# Patient Record
Sex: MALE | Race: OTHER | HISPANIC OR LATINO | URBAN - METROPOLITAN AREA
[De-identification: names, ages, dates, MRNs, and addresses within clinical notes are randomized per-mention and may not be internally consistent; named-entity substitution may affect disease eponyms.]

---

## 2019-03-26 ENCOUNTER — EMERGENCY (EMERGENCY)
Facility: HOSPITAL | Age: 29
LOS: 1 days | Discharge: HOME | End: 2019-03-26
Attending: EMERGENCY MEDICINE
Payer: COMMERCIAL

## 2019-03-26 VITALS
HEART RATE: 62 BPM | TEMPERATURE: 98 F | DIASTOLIC BLOOD PRESSURE: 76 MMHG | WEIGHT: 194.01 LBS | SYSTOLIC BLOOD PRESSURE: 125 MMHG | RESPIRATION RATE: 18 BRPM | HEIGHT: 69 IN | OXYGEN SATURATION: 99 %

## 2019-03-26 PROCEDURE — 73130 X-RAY EXAM OF HAND: CPT | Mod: 26,RT

## 2019-03-26 PROCEDURE — 29125 APPL SHORT ARM SPLINT STATIC: CPT

## 2019-03-26 PROCEDURE — 93010 ELECTROCARDIOGRAM REPORT: CPT | Mod: 59

## 2019-03-26 PROCEDURE — 93005 ELECTROCARDIOGRAM TRACING: CPT | Mod: XU

## 2019-03-26 PROCEDURE — 99283 EMERGENCY DEPT VISIT LOW MDM: CPT | Mod: 25

## 2019-03-26 PROCEDURE — 73130 X-RAY EXAM OF HAND: CPT

## 2019-03-26 PROCEDURE — 29125 APPL SHORT ARM SPLINT STATIC: CPT | Mod: RT

## 2019-03-26 RX ORDER — ACETAMINOPHEN 500 MG
975 TABLET ORAL ONCE
Qty: 0 | Refills: 0 | Status: COMPLETED | OUTPATIENT
Start: 2019-03-26 | End: 2019-03-26

## 2019-03-26 RX ORDER — BACITRACIN ZINC 500 UNIT/G
1 OINTMENT IN PACKET (EA) TOPICAL ONCE
Qty: 0 | Refills: 0 | Status: COMPLETED | OUTPATIENT
Start: 2019-03-26 | End: 2019-03-26

## 2019-03-26 RX ORDER — IBUPROFEN 200 MG
400 TABLET ORAL ONCE
Qty: 0 | Refills: 0 | Status: COMPLETED | OUTPATIENT
Start: 2019-03-26 | End: 2019-03-26

## 2019-03-26 RX ADMIN — Medication 1 APPLICATION(S): at 21:13

## 2019-03-26 RX ADMIN — Medication 400 MILLIGRAM(S): at 21:13

## 2019-03-26 RX ADMIN — Medication 975 MILLIGRAM(S): at 21:13

## 2019-03-26 NOTE — ED PROVIDER NOTE - MUSCULOSKELETAL [+], MLM
**ATTENDING ADDENDUM (Dr. John Neri): POSITIVE soft-tissue swelling along the dorsum of the right hand./JOINT PAIN

## 2019-03-26 NOTE — ED PROVIDER NOTE - CLINICAL SUMMARY MEDICAL DECISION MAKING FREE TEXT BOX
**ATTENDING MEDICAL DECISION MAKING/SYNTHESIS (Dr. John Neri): I have reviewed the Chief Complaint, the HPI, the ROS, and have directly performed and confirmed the findings on the Physical Examination. I have reviewed the medical decision making with all providers, as applicable. The PROBLEM REPRESENTATION at this time is: 28-year-old man right-hand dominant with right dorsal hand soft-tissue swelling and pain s/p blunt trauma. Also complains of vague centrally-located chest pain, not positional, pleuritic, or exertional (plays lots of soccer without syncope), but reproducible with arm movement? The MOST LIKELY DIAGNOSIS, and the LIST OF DIFFERENTIAL DIAGNOSES, includes (but is not limited to) the following: RIGHT HAND INJURY: fracture, dislocation, contusion, sprain, strain, abrasions. CHEST PAIN: acute coronary syndrome (NO evidence), pulmonary embolism/deep venous thrombosis (NO evidence), pneumothorax (NO evidence), pneumonia (NO evidence), vascular cause e.g. AAA, dissection or equivalent (NO evidence). The likelihood of each of these diagnoses has been appropriately considered in the context of this patient's presentation and my evaluation. PLAN: as described in EMR, including diagnostics, therapeutics and consultation as clinically warranted. I will continue to reevaluate the patient, including the results of all testing, and monitor response to therapy throughout the patient's course in the ED.

## 2019-03-26 NOTE — ED PROVIDER NOTE - OBJECTIVE STATEMENT
29 yo female with no pmh presenting for injury to right hand, states he dropped a metal object on the 29 yo female with no pmh presenting for injury to right hand, states he dropped a metal object on the hand at work. Had increase in swelling and decreased ROM, no numbness, tingling or parasthesias. 29 yo female with no pmh presenting for injury to right hand, states he dropped a metal object on the hand at work. Had increase in swelling and decreased ROM, no numbness, tingling or paresthesias.  **ATTENDING ADDENDUM (Dr. John Neri): I attest that I have directly examined this patient and elicited a comparable history of present illness and review of systems with my collaborating provider (NP/PA). I attest that I have made significant contributions to the documentation where necessary and as noted in the EMR. Of note, and in addition to the above, patient reports that he is right-hand dominant. Injury to dorsal aspect of the right hand as noted above. In addition, patient reported transient and vague chest pain, non-pleuritic, non-exertional, movement-associated, positional, or reproducible with palpation. VAS 1/10.

## 2019-03-26 NOTE — ED PROVIDER NOTE - EKG ADDITIONAL INFORMATION FREE TEXT
**ATTENDING ADDENDUM (Dr. John Neri): NO evidence of arrhythmia, ischemia, infarction or long QT syndrome.

## 2019-03-26 NOTE — ED PROVIDER NOTE - PROGRESS NOTE DETAILS
**ATTENDING ADDENDUM (Dr. John Neri): patient serially evaluated throughout ED course. NO acute deterioration up to this time in the ED. NO evidence of acute coronary syndrome, myocardial infarction, unstable angina, pulmonary embolism/deep venous thrombosis, pneumothorax, vascular etiology e.g. dissection or equivalent, or other worrisome pathology warranting advanced imaging or inpatient observation. ED diagnostics up to this time acknowledged, reviewed and noted. Agree with volar splinting. Agree with discharge home with close outpatient followup with primary care physician/provider.

## 2019-03-26 NOTE — ED PROVIDER NOTE - RESPIRATORY, MLM
Breath sounds clear and equal bilaterally. **ATTENDING ADDENDUM (Dr. John Neri): NO wheezing, rales, rhonchi, crackles, stridor, drooling, retractions, nasal flaring, or tripoding.

## 2019-03-26 NOTE — ED PROVIDER NOTE - CHPI ED SYMPTOMS POS
STIFFNESS/INFLAMMATION/PAIN/TENDERNESS/**ATTENDING ADDENDUM (Dr. John Neri): POSITIVE soft-tissue swelling on the dorsum of the right hand.

## 2019-03-26 NOTE — ED PROVIDER NOTE - OTHER FINDINGS
axis 78 NO ELIZABETH NO evidence of Brugada or long QT syndrome rSR' noted in V1-V2 but narrow QRS duration

## 2019-03-26 NOTE — ED PROVIDER NOTE - CARE PLAN
Principal Discharge DX:	Injury of right hand, initial encounter  Assessment and plan of treatment:	- stay hydrated. ice 20mins on, 40mins off in cycle.  - alternate between tylenol 975mg and ibuprofen 600mg every 4 hours as needed for pain-take with meals.  - follow up with your pcp in 1-2 days.  - follow up with ortho in 2-3 days.  - return if symptoms worsen, fever, weakness, numbness/tingling, blurred vision, difficulty ambulating and all other concerns. Principal Discharge DX:	Injury of right hand, initial encounter  Goal:	**ATTENDING ADDENDUM (Dr. John Neri): Goals of care include resolution of emergent/urgent symptoms and concerns, and restoration to baseline level of homeostasis.  Assessment and plan of treatment:	- stay hydrated. ice 20mins on, 40mins off in cycle.  - alternate between tylenol 975mg and ibuprofen 600mg every 4 hours as needed for pain-take with meals.  - follow up with your pcp in 1-2 days.  - follow up with ortho in 2-3 days.  - return if symptoms worsen, fever, weakness, numbness/tingling, blurred vision, difficulty ambulating and all other concerns.

## 2019-03-26 NOTE — ED PROVIDER NOTE - NSFOLLOWUPINSTRUCTIONS_ED_ALL_ED_FT
- stay hydrated. ice 20mins on, 40mins off in cycle. Keep right wrist splint intact until seen by orthopedics.  - alternate between tylenol 975mg and ibuprofen 600mg every 4 hours as needed for pain-take with meals.  - follow up with your pcp in 1-2 days.  - follow up with ortho in 2-3 days.  - return if symptoms worsen, fever, weakness, numbness/tingling, blurred vision, difficulty ambulating and all other concerns.

## 2019-03-26 NOTE — ED PROVIDER NOTE - DIAGNOSTIC INTERPRETATION
**ATTENDING ADDENDUM (Dr. John Neri): Radiographs reviewed. Pertinent findings include: NO fracture or dislocation.

## 2019-03-26 NOTE — ED PROVIDER NOTE - NSFOLLOWUPCLINICS_GEN_ALL_ED_FT
[facility] Orthopedic Surgery  Orthopedic Surgery  [address], 3rd & 4th [place] [address]  Phone: [phone]  Fax:   Follow Up Time: 1-3 Days    [facility]  Orthopedic Surgery  [address] [place] [id number]  Phone: [phone]  Fax:   Follow Up Time: 1-3 Days

## 2019-03-26 NOTE — ED PROVIDER NOTE - FAMILY DETAILS FREE TEXT FOR MDM ADDL HISTORY OBTAINED FROM QUESTION
**ATTENDING ADDENDUM (Dr. John Neri): family member(s) present during patient's ED visit; corroborated CC, HPI and review of systems as provided by patient.

## 2019-03-26 NOTE — ED PROVIDER NOTE - PHYSICAL EXAMINATION
A&Ox3, NAD. NCAT. PERRL, EOMI. Neck supple, no LAD. Lungs CTAB. +S1S2, RRR, No m/r/g. Abd soft, NT/ND, +BS, no rebound or guarding. Extremities WWP, no edema. Skin without rash. CN II-XII intact. Left hand with diffuse swelling, +TTP to middle of hand, no wrist pain or injury, Skin with overlaying , no purulence or drainaige. Strength 5/5 UE/LE. Sensation normal throughout. Gait normal. A&Ox3, NAD. NCAT. PERRL, EOMI. Neck supple, no LAD. Lungs CTAB. +S1S2, RRR, No m/r/g. Abd soft, NT/ND, +BS, no rebound or guarding. Extremities WWP, no edema. Skin without rash. CN II-XII intact. Left hand with diffuse swelling, +TTP to middle of hand, no wrist pain or injury, Skin with overlaying , no purulence or drainaige. Strength 5/5 UE/LE. Sensation normal throughout. Gait normal.  **ATTENDING ADDENDUM (Dr. John Neri): I have reviewed and substantially contributed to the elements of the PE as documented above. I have directly performed an examination of this patient in conjunction with the other members (EM resident/PA/NP) of the patient care team. Of note, and in addition to the above, there is soft-tissue swelling to the dorsum of the right hand proximate to the 2-4 metacarpals. NO numbness, tingling, weakness, or paresthesias. NO distal discoloration of the fingers of the right hand. Intact radial pulse. NO rotational deformity of the right fingers. NO volar or dorsal angulation noted.

## 2019-03-26 NOTE — ED PROVIDER NOTE - NS ED ROS FT
**ATTENDING ADDENDUM (Dr. John Neri): NO history of Hypertension, hyperlipidemia, Diabetes Mellitus, sudden cardiac death (in family), long QT syndrome, arrhythmia, exertional syncope, recent travel, fevers, chills, sick contacts, or trauma. DENIES cocaine, tobacco, ethanol, or other recreational or other drugs or abuse.

## 2019-03-26 NOTE — ED PROVIDER NOTE - PLAN OF CARE
- stay hydrated. ice 20mins on, 40mins off in cycle.  - alternate between tylenol 975mg and ibuprofen 600mg every 4 hours as needed for pain-take with meals.  - follow up with your pcp in 1-2 days.  - follow up with ortho in 2-3 days.  - return if symptoms worsen, fever, weakness, numbness/tingling, blurred vision, difficulty ambulating and all other concerns. **ATTENDING ADDENDUM (Dr. John Neri): Goals of care include resolution of emergent/urgent symptoms and concerns, and restoration to baseline level of homeostasis.

## 2019-03-26 NOTE — ED PROVIDER NOTE - MUSCULOSKELETAL MINIMAL EXAM
normal range of motion/neck supple/TENDERNESS/motor intact/**ATTENDING ADDENDUM (Dr. John Neri): see comment box above

## 2019-03-26 NOTE — ED ADULT NURSE NOTE - OBJECTIVE STATEMENT
28y male presents to ED complaining of R hand injury. Pt is a/ox4 states that he was trying to lift up a carpet when the top of his R hand struck a metal object. Pt has full ROM in R wrist and digits, with swelling abrasion and tenderness to top of hand

## 2019-03-26 NOTE — ED ADULT TRIAGE NOTE - CHIEF COMPLAINT QUOTE
R hand inj, hit with "piece of metal", abrasion to hand. +swelling, cap refill < 3 secs R hand inj, hit with "piece of metal", abrasion to hand. +swelling, cap refill < 3 secs. also reports that he was carrying heavy carpet when this happened which hit him in the chest. no sob R hand inj, hit with "piece of metal", abrasion to hand. +swelling, cap refill < 3 secs. also reports that he was carrying heavy carpet when this happened which hit him in the chest. no sob/abrasions to chest as per pt

## 2019-03-26 NOTE — ED ADULT NURSE NOTE - CHIEF COMPLAINT QUOTE
R hand inj, hit with "piece of metal", abrasion to hand. +swelling, cap refill < 3 secs. also reports that he was carrying heavy carpet when this happened which hit him in the chest. no sob/abrasions to chest as per pt

## 2019-03-27 NOTE — ED PROCEDURE NOTE - ATTENDING CONTRIBUTION TO CARE
**ATTENDING ADDENDUM (Dr. John Neri): I attest that I have directly examined this patient and reviewed and formulated the diagnostic and therapeutic management plan in collaboration with the advanced practitioner (NP, PA). Please see MDM note and remainder of EMR for findings from CC, HPI, ROS, and PE. (Leana)

## 2019-03-27 NOTE — ED PROCEDURE NOTE - PROCEDURE ADDITIONAL DETAILS
**ATTENDING ADDENDUM (Dr. John Neri): I was present during the key portions of the procedure and immediately available during the entire procedure. Agree with plan and management. Anticipatory guidance provided.

## 2019-03-27 NOTE — ED PROCEDURE NOTE - CPROC ED POST PROC CARE GUIDE1
Instructed patient/caregiver to follow-up with primary care physician./Verbal/written post procedure instructions were given to patient/caregiver./Elevate the injured extremity as instructed./Keep the cast/splint/dressing clean and dry./**ATTENDING ADDENDUM (Dr. John Neri): Anticipatory guidance provided.

## 2020-10-28 NOTE — ED PROVIDER NOTE - ATTENDING CONTRIBUTION TO CARE
lov 9-20  
**ATTENDING ADDENDUM (Dr. John Neri): I attest that I have directly examined this patient and reviewed and formulated the diagnostic and therapeutic management plan in collaboration with the advanced practitioner (NP, PA). Please see MDM note and remainder of EMR for findings from CC, HPI, ROS, and PE. (Leana)

## 2021-06-14 ENCOUNTER — EMERGENCY (EMERGENCY)
Facility: HOSPITAL | Age: 31
LOS: 1 days | Discharge: HOME | End: 2021-06-14
Attending: EMERGENCY MEDICINE
Payer: MEDICAID

## 2021-06-14 VITALS
SYSTOLIC BLOOD PRESSURE: 110 MMHG | HEART RATE: 71 BPM | OXYGEN SATURATION: 97 % | TEMPERATURE: 98 F | RESPIRATION RATE: 14 BRPM | DIASTOLIC BLOOD PRESSURE: 78 MMHG

## 2021-06-14 VITALS
OXYGEN SATURATION: 95 % | TEMPERATURE: 98 F | HEIGHT: 69 IN | RESPIRATION RATE: 18 BRPM | DIASTOLIC BLOOD PRESSURE: 71 MMHG | SYSTOLIC BLOOD PRESSURE: 109 MMHG | HEART RATE: 73 BPM | WEIGHT: 192.02 LBS

## 2021-06-14 LAB
APPEARANCE UR: CLEAR — SIGNIFICANT CHANGE UP
BILIRUB UR-MCNC: NEGATIVE — SIGNIFICANT CHANGE UP
COLOR SPEC: SIGNIFICANT CHANGE UP
DIFF PNL FLD: NEGATIVE — SIGNIFICANT CHANGE UP
GLUCOSE UR QL: NEGATIVE — SIGNIFICANT CHANGE UP
KETONES UR-MCNC: NEGATIVE — SIGNIFICANT CHANGE UP
LEUKOCYTE ESTERASE UR-ACNC: NEGATIVE — SIGNIFICANT CHANGE UP
NITRITE UR-MCNC: NEGATIVE — SIGNIFICANT CHANGE UP
PH UR: 6.5 — SIGNIFICANT CHANGE UP (ref 5–8)
PROT UR-MCNC: NEGATIVE — SIGNIFICANT CHANGE UP
SP GR SPEC: 1.02 — SIGNIFICANT CHANGE UP (ref 1.01–1.02)
UROBILINOGEN FLD QL: NEGATIVE — SIGNIFICANT CHANGE UP

## 2021-06-14 PROCEDURE — 99284 EMERGENCY DEPT VISIT MOD MDM: CPT

## 2021-06-14 PROCEDURE — 99283 EMERGENCY DEPT VISIT LOW MDM: CPT | Mod: 25

## 2021-06-14 PROCEDURE — 81003 URINALYSIS AUTO W/O SCOPE: CPT

## 2021-06-14 PROCEDURE — 87491 CHLMYD TRACH DNA AMP PROBE: CPT

## 2021-06-14 PROCEDURE — 87086 URINE CULTURE/COLONY COUNT: CPT

## 2021-06-14 PROCEDURE — 96372 THER/PROPH/DIAG INJ SC/IM: CPT | Mod: XU

## 2021-06-14 PROCEDURE — 87591 N.GONORRHOEAE DNA AMP PROB: CPT

## 2021-06-14 RX ORDER — KETOROLAC TROMETHAMINE 30 MG/ML
15 SYRINGE (ML) INJECTION ONCE
Refills: 0 | Status: DISCONTINUED | OUTPATIENT
Start: 2021-06-14 | End: 2021-06-14

## 2021-06-14 RX ORDER — LIDOCAINE 4 G/100G
1 CREAM TOPICAL ONCE
Refills: 0 | Status: COMPLETED | OUTPATIENT
Start: 2021-06-14 | End: 2021-06-14

## 2021-06-14 RX ORDER — ACETAMINOPHEN 500 MG
975 TABLET ORAL ONCE
Refills: 0 | Status: COMPLETED | OUTPATIENT
Start: 2021-06-14 | End: 2021-06-14

## 2021-06-14 RX ADMIN — LIDOCAINE 1 PATCH: 4 CREAM TOPICAL at 09:41

## 2021-06-14 RX ADMIN — Medication 975 MILLIGRAM(S): at 09:41

## 2021-06-14 RX ADMIN — Medication 15 MILLIGRAM(S): at 09:41

## 2021-06-14 NOTE — ED ADULT TRIAGE NOTE - CHIEF COMPLAINT QUOTE
lower back pain since Friday, pt works as , pain radiates down R leg per pt, took muscle relaxant yesterday without relief, + hematuria, denies incontinence

## 2021-06-14 NOTE — ED PROVIDER NOTE - OBJECTIVE STATEMENT
30 year old male with history of chronic intermittent low back pain following injury in 2012, presenting with acute on chronic low back pain x 3d. States that he has had sharp low back/buttock region pain radiating down to b/l knees not improved with flexeril, some relief with diclofenac shot yesterday. Denies fevers/chills, urinary/fecal incontinence, saddle anesthesia, weakness. Does report 1 episode of "reddish urine" yesterday, "sometimes painful".

## 2021-06-14 NOTE — ED PROVIDER NOTE - ATTENDING CONTRIBUTION TO CARE
Attending MD Kirk:  I personally have seen and examined this patient.  Resident note reviewed and agree on plan of care and except where noted.  See HPI, PE, and MDM for details.

## 2021-06-14 NOTE — ED PROVIDER NOTE - NS ED ROS FT
Gen: No fever, no weight loss, no fatigue  CV: No chest pain, no palpitations  HEENT: No sore throat, no hoarseness  Skin: No rash, no color changes  Resp: No SOB, no cough  Endo: No sensitivity to heat or cold, no appetite changes  GI: No constipation, no diarrhea, no nausea, no vomiting  Msk: +back pain, no LE swelling, +extremity pain  : No dysuria, no increased frequency  Neuro: No LOC, no weakness, no numbness

## 2021-06-14 NOTE — ED ADULT NURSE NOTE - CHIEF COMPLAINT QUOTE
BURN
lower back pain since Friday, pt works as , pain radiates down R leg per pt, took muscle relaxant yesterday without relief, + hematuria, denies incontinence

## 2021-06-14 NOTE — ED PROVIDER NOTE - PATIENT PORTAL LINK FT
You can access the FollowMyHealth Patient Portal offered by [facility] by registering at the following website: [email or web address]. By joining [employer]’s FollowMyHealth portal, you will also be able to view your health information using other applications (apps) compatible with our system.

## 2021-06-14 NOTE — ED ADULT NURSE NOTE - OBJECTIVE STATEMENT
30 year old male with history of chronic intermittent low back pain following injury in 2012, presenting with acute on chronic low back pain x 3d. States that he has had sharp low back/buttock region pain radiating down to b/l knees not improved with flexeril, some relief with diclofenac shot yesterday. Denies fevers/chills, urinary/fecal incontinence, saddle anesthesia, weakness. Does report 1 episode of "reddish urine" yesterday, "sometimes painful"  Pt able to ambulate with some difficulty no numbness or tingling noted Pt states he has some painful urination at times but does not relate it to his back pain [place]

## 2021-06-14 NOTE — ED PROVIDER NOTE - NSFOLLOWUPINSTRUCTIONS_ED_ALL_ED_FT
You were seen in the Emergency Department for: back pain    For pain/fever, you may take Tylenol (acetaminophen) 650 mg every 6 hours, OR Advil (ibuprofen) 600 mg every 8 hours.    Please follow up with a Spine Specialist as discussed. You were referred to our Discharge Lounge and someone will call you within 24-48 hours to help set up an appointment. If you are not contacted within that time, please call [phone] to find a provider who is convenient for you.    You should return to the Emergency Department if you feel any new/worsening/persistent symptoms including but not limited to: numbness/weakness of a body part, fever, chills, difficulty urinating or moving your bowels, chest pain, difficulty breathing, loss of consciousness, bleeding, uncontrolled pain

## 2021-06-14 NOTE — ED PROVIDER NOTE - PHYSICAL EXAMINATION
Gen - NAD; mildly uncomfortable appearing; A+Ox3   HEENT - NCAT, EOMI  Neck - supple  Resp - CTAB, symmetric breath sounds  CV -  RRR  Abd - soft, NT, ND; no guarding or rebound  Back - no midline or CVA tenderness  MSK - 5/5 strength and FROM b/l UE and LE; +straight leg test L  Extrem - 3+ distal pulses b/L UE and LE  Skin - no rash or bruising, warm and well perfused  Neuro - no focal motor or sensation deficits, normal gait

## 2021-06-14 NOTE — ED PROVIDER NOTE - CLINICAL SUMMARY MEDICAL DECISION MAKING FREE TEXT BOX
Attending MD Kirk:  30M with left low back pain, acute on chronic. History without red flags to suggest cauda equina or serious spinal pathology, examination wnl, no midline spinal ttp, motor and sensory intact b/l LEs. Likely lumbosacral strain vs lumbar radiculopathy. No indication for urgent spinal imaging. Patient notes maybe "red" urine over past few days and dysuria "sometimes". I do not suspect ureteral colic or complicated  infection in this gentleman given history points clearly to mechanical low back pain but will check screening UA and G/C.  examination was normal. Plan for analgesia, referral for PT and spine specialist for conservative management       *The above represents an initial assessment/impression. Please refer to progress notes for potential changes in patient clinical course*

## 2021-06-15 PROBLEM — M54.9 DORSALGIA, UNSPECIFIED: Chronic | Status: ACTIVE | Noted: 2021-06-14

## 2021-06-15 PROBLEM — Z00.00 ENCOUNTER FOR PREVENTIVE HEALTH EXAMINATION: Status: ACTIVE | Noted: 2021-06-15

## 2021-06-15 LAB
C TRACH RRNA SPEC QL NAA+PROBE: SIGNIFICANT CHANGE UP
CULTURE RESULTS: SIGNIFICANT CHANGE UP
N GONORRHOEA RRNA SPEC QL NAA+PROBE: SIGNIFICANT CHANGE UP
SPECIMEN SOURCE: SIGNIFICANT CHANGE UP
SPECIMEN SOURCE: SIGNIFICANT CHANGE UP

## 2021-06-16 ENCOUNTER — APPOINTMENT (OUTPATIENT)
Dept: ORTHOPEDIC SURGERY | Facility: CLINIC | Age: 31
End: 2021-06-16
Payer: MEDICAID

## 2021-06-16 VITALS
BODY MASS INDEX: 30.86 KG/M2 | HEART RATE: 73 BPM | DIASTOLIC BLOOD PRESSURE: 63 MMHG | WEIGHT: 192 LBS | SYSTOLIC BLOOD PRESSURE: 117 MMHG | HEIGHT: 66 IN

## 2021-06-16 DIAGNOSIS — M54.16 RADICULOPATHY, LUMBAR REGION: ICD-10-CM

## 2021-06-16 DIAGNOSIS — M51.36 OTHER INTERVERTEBRAL DISC DEGENERATION, LUMBAR REGION: ICD-10-CM

## 2021-06-16 DIAGNOSIS — M54.5 LOW BACK PAIN: ICD-10-CM

## 2021-06-16 PROCEDURE — 72100 X-RAY EXAM L-S SPINE 2/3 VWS: CPT | Mod: 26

## 2021-06-16 PROCEDURE — 99204 OFFICE O/P NEW MOD 45 MIN: CPT

## 2021-06-16 RX ORDER — METHYLPREDNISOLONE 4 MG/1
4 TABLET ORAL
Qty: 1 | Refills: 0 | Status: ACTIVE | COMMUNITY
Start: 2021-06-16 | End: 1900-01-01

## 2021-06-28 ENCOUNTER — NON-APPOINTMENT (OUTPATIENT)
Age: 31
End: 2021-06-28

## 2021-11-08 ENCOUNTER — EMERGENCY (EMERGENCY)
Facility: HOSPITAL | Age: 31
LOS: 1 days | Discharge: HOME | End: 2021-11-08
Attending: EMERGENCY MEDICINE
Payer: MEDICAID

## 2021-11-08 VITALS
HEART RATE: 55 BPM | TEMPERATURE: 98 F | OXYGEN SATURATION: 100 % | SYSTOLIC BLOOD PRESSURE: 127 MMHG | WEIGHT: 190.04 LBS | DIASTOLIC BLOOD PRESSURE: 74 MMHG | RESPIRATION RATE: 16 BRPM

## 2021-11-08 PROCEDURE — 71101 X-RAY EXAM UNILAT RIBS/CHEST: CPT | Mod: 26

## 2021-11-08 PROCEDURE — 71250 CT THORAX DX C-: CPT | Mod: 26,MG

## 2021-11-08 PROCEDURE — 99284 EMERGENCY DEPT VISIT MOD MDM: CPT | Mod: 25

## 2021-11-08 PROCEDURE — G1004: CPT

## 2021-11-08 PROCEDURE — 99284 EMERGENCY DEPT VISIT MOD MDM: CPT

## 2021-11-08 PROCEDURE — 71101 X-RAY EXAM UNILAT RIBS/CHEST: CPT

## 2021-11-08 PROCEDURE — 71250 CT THORAX DX C-: CPT | Mod: MG

## 2021-11-08 RX ORDER — ACETAMINOPHEN 500 MG
975 TABLET ORAL ONCE
Refills: 0 | Status: COMPLETED | OUTPATIENT
Start: 2021-11-08 | End: 2021-11-08

## 2021-11-08 RX ORDER — LIDOCAINE 4 G/100G
1 CREAM TOPICAL ONCE
Refills: 0 | Status: COMPLETED | OUTPATIENT
Start: 2021-11-08 | End: 2021-11-08

## 2021-11-08 RX ADMIN — Medication 975 MILLIGRAM(S): at 21:13

## 2021-11-08 RX ADMIN — LIDOCAINE 1 PATCH: 4 CREAM TOPICAL at 21:12

## 2021-11-08 RX ADMIN — Medication 975 MILLIGRAM(S): at 21:12

## 2021-11-08 NOTE — ED PROVIDER NOTE - MUSCULOSKELETAL, MLM
left sided rib pain in midclavicular line below nipple, tender to palpation. Spine appears normal, range of motion is not limited, no muscle or joint tenderness

## 2021-11-08 NOTE — ED PROVIDER NOTE - PATIENT PORTAL LINK FT
You can access the FollowMyHealth Patient Portal offered by [facility] by registering at the following website: [email or web address]. By joining [employer]’s FollowMyHealth portal, you will also be able to view your health information using other applications (apps) compatible with our system.

## 2021-11-08 NOTE — ED PROVIDER NOTE - SKIN, MLM
Skin normal color for race, warm, dry and intact. no ecchymosis, swelling or erythema to left anterolateral chest wall

## 2021-11-08 NOTE — ED ADULT TRIAGE NOTE - CHIEF COMPLAINT QUOTE
PT C/O right sided rib pain for one week.  Reports last week when he was playing soccer he got elbowed in the ribs.  No bruising.  Pain with inspiration. Denies SOB.

## 2021-11-08 NOTE — ED PROVIDER NOTE - CLINICAL SUMMARY MEDICAL DECISION MAKING FREE TEXT BOX
31 yo male pt presents with left sided rib/anterolateral chest wall pain 2/2 to direct blow from elbow during soccer game last week. pt states pain is getting worse and yesterday started to have pain with deep breathing.   ordered imaging and medication

## 2021-11-08 NOTE — ED PROVIDER NOTE - ATTENDING CONTRIBUTION TO CARE
The patient seen and examined    Rib Pain    I, Norman Arambula, performed the initial face to face bedside interview with this patient regarding history of present illness, review of symptoms and relevant past medical, social and family history.  I completed an independent physical examination.  I was the initial provider who evaluated this patient. I have signed out the follow up of any pending tests (i.e. labs, radiological studies) to the ACP and PA student.  I have communicated the patient’s plan of care and disposition with the ACP and PA student.

## 2021-11-08 NOTE — ED PROVIDER NOTE - OBJECTIVE STATEMENT
29 yo male pt with h/o kidney problems x10 years ago (does not know name of condition), presents to ED due to left sided rib pain x1 week following hit with elbow during soccer now worsening with pleuritic chest pain with inspiration radiating to the axilla. notes pain is worse with movement, palpation and breathing. Pt did not take any pain medication, has been icing the area. pt denies fever, headaches, chills, cough, SOB, palpitations, nausea, vomiting, and abdominal pain.

## 2025-02-02 ENCOUNTER — INPATIENT (INPATIENT)
Facility: HOSPITAL | Age: 35
LOS: 0 days | Discharge: HOME | DRG: 195 | End: 2025-02-03
Attending: HOSPITALIST | Admitting: HOSPITALIST
Payer: MEDICAID

## 2025-02-02 VITALS
TEMPERATURE: 101 F | DIASTOLIC BLOOD PRESSURE: 43 MMHG | WEIGHT: 196.21 LBS | OXYGEN SATURATION: 89 % | SYSTOLIC BLOOD PRESSURE: 69 MMHG | RESPIRATION RATE: 26 BRPM | HEART RATE: 211 BPM

## 2025-02-02 LAB
ALBUMIN SERPL ELPH-MCNC: 4.1 G/DL — SIGNIFICANT CHANGE UP (ref 3.3–5.2)
ALP SERPL-CCNC: 58 U/L — SIGNIFICANT CHANGE UP (ref 40–120)
ALT FLD-CCNC: 12 U/L — SIGNIFICANT CHANGE UP
ANION GAP SERPL CALC-SCNC: 12 MMOL/L — SIGNIFICANT CHANGE UP (ref 5–17)
APTT BLD: 29.3 SEC — SIGNIFICANT CHANGE UP (ref 24.5–35.6)
AST SERPL-CCNC: 20 U/L — SIGNIFICANT CHANGE UP
BASOPHILS # BLD AUTO: 0.01 K/UL — SIGNIFICANT CHANGE UP (ref 0–0.2)
BASOPHILS NFR BLD AUTO: 0.2 % — SIGNIFICANT CHANGE UP (ref 0–2)
BILIRUB SERPL-MCNC: 0.3 MG/DL — LOW (ref 0.4–2)
BUN SERPL-MCNC: 13 MG/DL — SIGNIFICANT CHANGE UP (ref 8–20)
CALCIUM SERPL-MCNC: 8.8 MG/DL — SIGNIFICANT CHANGE UP (ref 8.4–10.5)
CHLORIDE SERPL-SCNC: 99 MMOL/L — SIGNIFICANT CHANGE UP (ref 96–108)
CO2 SERPL-SCNC: 24 MMOL/L — SIGNIFICANT CHANGE UP (ref 22–29)
CREAT SERPL-MCNC: 1.26 MG/DL — SIGNIFICANT CHANGE UP (ref 0.5–1.3)
EGFR: 78 ML/MIN/1.73M2 — SIGNIFICANT CHANGE UP
EOSINOPHIL # BLD AUTO: 0 K/UL — SIGNIFICANT CHANGE UP (ref 0–0.5)
EOSINOPHIL NFR BLD AUTO: 0 % — SIGNIFICANT CHANGE UP (ref 0–6)
GAS PNL BLDV: SIGNIFICANT CHANGE UP
GLUCOSE SERPL-MCNC: 139 MG/DL — HIGH (ref 70–99)
HCT VFR BLD CALC: 36 % — LOW (ref 39–50)
HGB BLD-MCNC: 13 G/DL — SIGNIFICANT CHANGE UP (ref 13–17)
HIV 1 & 2 AB SERPL IA.RAPID: SIGNIFICANT CHANGE UP
IMM GRANULOCYTES NFR BLD AUTO: 0.4 % — SIGNIFICANT CHANGE UP (ref 0–0.9)
INR BLD: 1.08 RATIO — SIGNIFICANT CHANGE UP (ref 0.85–1.16)
LYMPHOCYTES # BLD AUTO: 0.66 K/UL — LOW (ref 1–3.3)
LYMPHOCYTES # BLD AUTO: 12.4 % — LOW (ref 13–44)
MCHC RBC-ENTMCNC: 30 PG — SIGNIFICANT CHANGE UP (ref 27–34)
MCHC RBC-ENTMCNC: 36.1 G/DL — HIGH (ref 32–36)
MCV RBC AUTO: 83.1 FL — SIGNIFICANT CHANGE UP (ref 80–100)
MONOCYTES # BLD AUTO: 0.23 K/UL — SIGNIFICANT CHANGE UP (ref 0–0.9)
MONOCYTES NFR BLD AUTO: 4.3 % — SIGNIFICANT CHANGE UP (ref 2–14)
NEUTROPHILS # BLD AUTO: 4.42 K/UL — SIGNIFICANT CHANGE UP (ref 1.8–7.4)
NEUTROPHILS NFR BLD AUTO: 82.7 % — HIGH (ref 43–77)
PLATELET # BLD AUTO: 204 K/UL — SIGNIFICANT CHANGE UP (ref 150–400)
POTASSIUM SERPL-MCNC: 3.4 MMOL/L — LOW (ref 3.5–5.3)
POTASSIUM SERPL-SCNC: 3.4 MMOL/L — LOW (ref 3.5–5.3)
PROT SERPL-MCNC: 7.6 G/DL — SIGNIFICANT CHANGE UP (ref 6.6–8.7)
PROTHROM AB SERPL-ACNC: 12.2 SEC — SIGNIFICANT CHANGE UP (ref 9.9–13.4)
RBC # BLD: 4.33 M/UL — SIGNIFICANT CHANGE UP (ref 4.2–5.8)
RBC # FLD: 11.4 % — SIGNIFICANT CHANGE UP (ref 10.3–14.5)
SODIUM SERPL-SCNC: 135 MMOL/L — SIGNIFICANT CHANGE UP (ref 135–145)
TROPONIN T, HIGH SENSITIVITY RESULT: <6 NG/L — SIGNIFICANT CHANGE UP (ref 0–51)
WBC # BLD: 5.34 K/UL — SIGNIFICANT CHANGE UP (ref 3.8–10.5)
WBC # FLD AUTO: 5.34 K/UL — SIGNIFICANT CHANGE UP (ref 3.8–10.5)

## 2025-02-02 PROCEDURE — 99291 CRITICAL CARE FIRST HOUR: CPT

## 2025-02-02 PROCEDURE — 71045 X-RAY EXAM CHEST 1 VIEW: CPT | Mod: 26

## 2025-02-02 RX ORDER — BACTERIOSTATIC SODIUM CHLORIDE 0.9 %
2800 VIAL (ML) INJECTION ONCE
Refills: 0 | Status: COMPLETED | OUTPATIENT
Start: 2025-02-02 | End: 2025-02-02

## 2025-02-02 RX ORDER — ACETAMINOPHEN 160 MG/5ML
975 SUSPENSION ORAL ONCE
Refills: 0 | Status: COMPLETED | OUTPATIENT
Start: 2025-02-02 | End: 2025-02-02

## 2025-02-02 RX ORDER — CEFTRIAXONE 250 MG/1
1000 INJECTION, POWDER, FOR SOLUTION INTRAMUSCULAR; INTRAVENOUS ONCE
Refills: 0 | Status: COMPLETED | OUTPATIENT
Start: 2025-02-02 | End: 2025-02-02

## 2025-02-02 RX ORDER — CEFTRIAXONE 250 MG/1
1000 INJECTION, POWDER, FOR SOLUTION INTRAMUSCULAR; INTRAVENOUS ONCE
Refills: 0 | Status: DISCONTINUED | OUTPATIENT
Start: 2025-02-02 | End: 2025-02-02

## 2025-02-02 RX ADMIN — CEFTRIAXONE 1000 MILLIGRAM(S): 250 INJECTION, POWDER, FOR SOLUTION INTRAMUSCULAR; INTRAVENOUS at 22:50

## 2025-02-02 RX ADMIN — ACETAMINOPHEN 975 MILLIGRAM(S): 160 SUSPENSION ORAL at 22:50

## 2025-02-02 RX ADMIN — Medication 2800 MILLILITER(S): at 23:28

## 2025-02-02 RX ADMIN — Medication 2800 MILLILITER(S): at 22:40

## 2025-02-02 NOTE — ED ADULT TRIAGE NOTE - PAIN RATING/NUMBER SCALE (0-10): ACTIVITY
The skin at the access site was anesthetized. Using a micropuncture needle with the Seldinger technique and ultrasound (TreSensa) the right femoral vein was succesfully accessed in an antegrade fashion over the guidewire, under fluoroscopic guidance using a Ss Kit 6fr 10cm, Flex Straight 0.021in X 45cm. Ultrasound found the vessel was patent. A permanent recording was saved.  6 (moderate pain)

## 2025-02-02 NOTE — ED ADULT NURSE NOTE - NSFALLUNIVINTERV_ED_ALL_ED
Bed/Stretcher in lowest position, wheels locked, appropriate side rails in place/Call bell, personal items and telephone in reach/Instruct patient to call for assistance before getting out of bed/chair/stretcher/Non-slip footwear applied when patient is off stretcher/[place] to call system/Physically safe environment - no spills, clutter or unnecessary equipment/Purposeful proactive rounding/Room/bathroom lighting operational, light cord in reach

## 2025-02-02 NOTE — ED PROVIDER NOTE - PHYSICAL EXAMINATION
General: NAD, weak appearing  HEENT: Normocephalic, atraumatic  Neck: No apparent stiffness or JVD  Pulm: Chest wall symmetric and nontender, lungs clear to ascultation   Cardiac: Regular rate and regular rhythm  Abdomen: Nontender and nondistended  Skin: Skin is warm, dry and intact without rashes or lesions.  Neuro: No motor or sensory deficits above reported baseline  MSK: No deformity or tenderness above reported baseline

## 2025-02-02 NOTE — ED PROVIDER NOTE - OBJECTIVE STATEMENT
Pt is a 33 yo male with no significant PMH presenting with fever. Pt had a fever, cough and malaise for 3 days now that worsened today. Pt's daughter tested positive for influenze 5 days ago and had similar symptoms. Pt reports that he last took tylenol 6 hours prior to arrival. Tonight, pt felt very weak and lightheaded, and has not been able to eat or drink much in the past 2 days. pt denies SOB, chest pain, vomiting, diarrhea, urinary symptoms.

## 2025-02-02 NOTE — ED PROVIDER NOTE - ATTENDING CONTRIBUTION TO CARE
Called to bedside by triage nurse for evaluation of acute hypotension.   33 yo male with no significant PMH presenting with fever. Pt had a fever, cough and malaise for 3 days now that worsened today. Pt's daughter tested positive for influenza 5 days ago and had similar symptoms. Pt reports that he last took Tylenol 6 hours prior to arrival. Tonight, pt felt very weak and lightheaded, and has not been able to eat or drink much in the past 2 days. pt denies SOB, chest pain, vomiting, diarrhea, urinary symptoms.    CONSTITUTIONAL: In no apparent distress.  HEENMT: Airway patent,  normal appearing mouth, nose, throat, neck supple with full range of motion, no cervical adenopathy.  EYES: Pupils equal, round and reactive to light, Extra-ocular movement intact, eyes are clear b/l  CARDIAC: tachycardic rate and rhythm, Heart sounds S1 S2 present  RESPIRATORY: No respiratory distress. No stridor, Lungs sounds clear with good aeration bilaterally.   GASTROINTESTINAL: Abdomen soft, non-tender and non-distended, no rebound, no guarding and no masses.   MUSCULOSKELETAL: Spine appears normal, movement of extremities grossly intact.  NEUROLOGICAL: Alert and interactive, no focal deficits, tone is normal, moving all extremities well,  SKIN: Hot no cyanosis, no pallor, no jaundice, no rash    Due to the a high probability of clinically significant, life threatening deterioration, the patient required high level of preparedness to intervene emergently. I personally spent critical care time directly and personally managing the patient. This included (but not limited too reviewing  vitals, managing orders, and determining and adjusting plan depending on response to interventions.     I, Anastasia Duvall, personally saw the patient with the resident, and completed the key components of the history and physical exam. I then discussed the management plan with the resident.

## 2025-02-02 NOTE — ED ADULT NURSE NOTE - OBJECTIVE STATEMENT
pt presenting to ED for generalized malaise, fevers, cough x3 days. pt daughter sick at home +flu. Upon initial vitals pt hypotensive, tachycardiac and febrile. Code sepsis protocol initiated. pt a&ox4, able to speak in full sentences, denies shortness of breath, chest pain, N/V/D.

## 2025-02-02 NOTE — ED PROVIDER NOTE - CLINICAL SUMMARY MEDICAL DECISION MAKING FREE TEXT BOX
Pt is a 35 yo male with no significant PMH presenting with fever. Pt had a fever, cough and malaise for 3 days now that worsened today. Pt's daughter tested positive for influenze 5 days ago and had similar symptoms. Pt reports that he last took tylenol 6 hours prior to arrival. Tonight, pt felt very weak and lightheaded, and has not been able to eat or drink much in the past 2 days. pt denies SOB, chest pain, vomiting, diarrhea, urinary symptoms.     vs shows fever. Pt came in with vitals of HR>200 and sbp 69, repeat vitals show normal bp and normal HR. Meeting sirs criteria, ordered sepsis workup, rocephin, tylenol and sepsis bolus. likely viral. exam shows clear lungs, soft nontender abdomen. Pt is a 35 yo male with no significant PMH presenting with fever. Pt had a fever, cough and malaise for 3 days now that worsened today. Pt's daughter tested positive for influenze 5 days ago and had similar symptoms. Pt reports that he last took tylenol 6 hours prior to arrival. Tonight, pt felt very weak and lightheaded, and has not been able to eat or drink much in the past 2 days. pt denies SOB, chest pain, vomiting, diarrhea, urinary symptoms.     vs shows fever. Pt came in with vitals of HR>200 and sbp 69, repeat vitals show normal bp and normal HR. Meeting sirs criteria, ordered sepsis workup, rocephin, tylenol and sepsis bolus. likely viral. exam shows clear lungs, soft nontender abdomen.    Patient noted to be orthostatic. Will hydrate and re-evaluate. Pt is a 35 yo male with no significant PMH presenting with fever. Pt had a fever, cough and malaise for 3 days now that worsened today. Pt's daughter tested positive for influenze 5 days ago and had similar symptoms. Pt reports that he last took tylenol 6 hours prior to arrival. Tonight, pt felt very weak and lightheaded, and has not been able to eat or drink much in the past 2 days. pt denies SOB, chest pain, vomiting, diarrhea, urinary symptoms.     vs shows fever. Pt came in with vitals of HR>200 and sbp 69, repeat vitals show normal bp and normal HR. Meeting sirs criteria, ordered sepsis workup, rocephin, tylenol and sepsis bolus. likely viral. exam shows clear lungs, soft nontender abdomen.    Patient noted to be orthostatic. Will hydrate and re-evaluate. positive for flu A.     Gal: pt still febrile after 3800mL ns and multiple tylenols and toradol. pt becoming intermittently mildly hypotensive satting in low 90s. admitted to tele. added azithro and tamiflu. ordered cooling blanket.

## 2025-02-02 NOTE — ED PROVIDER NOTE - PROGRESS NOTE DETAILS
Gal: Pt feels improved after additional meds and another liter NS. given strict return precautions and instructed to follow up with pcp after course of antibiotics. Gal: Pt feels improved after additional meds and another liter NS. Gal: pt still febrile after 3800mL ns and multiple tylenols and toradol. pt becoming intermittently mildly hypotensive satting in low 90s. admitted to tele. added azithro and tamiflu. ordered cooling blanket.

## 2025-02-02 NOTE — ED PROVIDER NOTE - PATIENT PORTAL LINK FT
You can access the FollowMyHealth Patient Portal offered by [facility] by registering at the following website: [email or web address]. By joining [employer]’s FollowMyHealth portal, you will also be able to view your health information using other applications (apps) compatible with our system.

## 2025-02-02 NOTE — ED PROVIDER NOTE - CARE PLAN
1 Principal Discharge DX:	Right lower lobe pneumonia  Secondary Diagnosis:	Acute dehydration   Principal Discharge DX:	Right lower lobe pneumonia  Secondary Diagnosis:	Acute dehydration  Secondary Diagnosis:	Influenza

## 2025-02-02 NOTE — ED PROVIDER NOTE - NSFOLLOWUPINSTRUCTIONS_ED_ALL_ED_FT
1) Follow up with your doctor in 1-2 days  2) Return to the ER for worsening or concerning symptoms  3) Take medication as prescribed    Community-Acquired Pneumonia, Adult    Pneumonia is a lung infection that causes inflammation and the buildup of mucus and fluids in the lungs. This may cause coughing and difficulty breathing. Community-acquired pneumonia is pneumonia that develops in people who are not, and have not recently been, in a hospital or other health care facility.    Usually, pneumonia develops as a result of an illness that is caused by a virus, such as the common cold and the flu (influenza). It can also be caused by bacteria or fungi. While the common cold and influenza can pass from person to person (are contagious), pneumonia itself is not considered contagious.    What are the causes?     This condition may be caused by:    Viruses.  Bacteria.  Fungi, such as molds or mushrooms.    What increases the risk?  The following factors may make you more likely to develop this condition:    Having certain medical conditions, such as:    A long-term (chronic) disease, which may include chronic obstructive pulmonary disease (COPD), asthma, heart failure, cystic fibrosis, diabetes, kidney disease, sickle cell disease, and human immunodeficiency virus (HIV).  A condition that increases the risk of breathing in (aspirating) mucus and other fluids from your mouth and nose.  A weakened body defense system (immune system).  Having had your spleen removed (splenectomy). The spleen is the organ that helps fight germs and infections.  Not cleaning your teeth and gums well (poor dental hygiene).  Using tobacco products.  Traveling to places where germs that cause pneumonia are present.  Being near certain animals, or animal habitats, that have germs that cause pneumonia.  Being older than 65 years of age.    What are the signs or symptoms?  Symptoms of this condition include:    A dry cough or a wet (productive) cough.  A fever.  Sweating or chills.  Chest pain, especially when breathing deeply or coughing.  Fast breathing, difficulty breathing, or shortness of breath.  Tiredness (fatigue).  Muscle aches.    How is this diagnosed?     This condition may be diagnosed based on your medical history or a physical exam. You may also have tests, including:    Chest X-rays.  Tests of the level of oxygen and other gases in your blood.  Tests of:    Your blood.  Mucus from your lungs (sputum).  Fluid around your lungs (pleural fluid).  Your urine.    If your pneumonia is severe, other tests may be done to learn more about the cause.    How is this treated?  Treatment for this condition depends on many factors, such as the cause of your pneumonia, your medicines, and other medical conditions that you have.    For most adults, pneumonia may be treated at home. In some cases, treatment must happen in a hospital and may include:    Medicines that are given by mouth (orally) or through an IV, including:    Antibiotic medicines, if bacteria caused the pneumonia.  Medicines that kill viruses (antiviral medicines), if a virus caused the pneumonia.  Oxygen therapy.    Severe pneumonia, although rare, may require the following treatments:    Mechanical ventilation.This procedure uses a machine to help you breathe if you cannot breathe well on your own or maintain a safe level of blood oxygen.  Thoracentesis. This procedure removes any buildup of pleural fluid to help with breathing.    Follow these instructions at home:         Medicines    Take over-the-counter and prescription medicines only as told by your health care provider.  Take cough medicine only if you have trouble sleeping. Cough medicine can prevent your body from removing mucus from your lungs.  If you were prescribed an antibiotic medicine, take it as told by your health care provider. Do not stop taking the antibiotic even if you start to feel better.        Lifestyle      Do not drink alcohol.  Do not use any products that contain nicotine or tobacco, such as cigarettes, e-cigarettes, and chewing tobacco. If you need help quitting, ask your health care provider.  Eat a healthy diet. This includes plenty of vegetables, fruits, whole grains, low-fat dairy products, and lean protein.        General instructions    Rest a lot and get at least 8 hours of sleep each night.  Sleep in a partly upright position at night. Place a few pillows under your head or sleep in a reclining chair.  Return to your normal activities as told by your health care provider. Ask your health care provider what activities are safe for you.  Drink enough fluid to keep your urine pale yellow. This helps to thin the mucus in your lungs.  If your throat is sore, gargle with a salt–water mixture 3–4 times a day or as needed. To make a salt–water mixture, completely dissolve ½–1 tsp (3–6 g) of salt in 1 cup (237 mL) of warm water.  Keep all follow-up visits as told by your health care provider. This is important.    How is this prevented?  You can lower your risk of developing community-acquired pneumonia by:    Getting the pneumonia vaccine. There are different types and schedules of pneumonia vaccines. Ask your health care provider which option is best for you. Consider getting the pneumonia vaccine if:    You are older than 65 years of age.  You are 19–65 years of age and are receiving cancer treatment, have chronic lung disease, or have other medical conditions that affect your immune system. Ask your health care provider if this applies to you.  Getting your influenza vaccine every year. Ask your health care provider which type of vaccine is best for you.  Getting regular dental checkups.  Washing your hands often with soap and water for at least 20 seconds. If soap and water are not available, use hand .    Contact a health care provider if you have:  A fever.  Trouble sleeping because you cannot control your cough with cough medicine.    Get help right away if:  Your shortness of breath becomes worse.  Your chest pain increases.  Your sickness becomes worse, especially if you are an older adult or have a weak immune system.  You cough up blood.    These symptoms may represent a serious problem that is an emergency. Do not wait to see if the symptoms will go away. Get medical help right away. Call your local emergency services (911 in the [place] Do not drive yourself to the hospital.    Summary  Pneumonia is an infection of the lungs.  Community-acquired pneumonia develops in people who have not been in the hospital. It can be caused by bacteria, viruses, or fungi.  This condition may be treated with antibiotics or antiviral medicines.  Severe pneumonia may require a hospital stay and treatment to help with breathing.    ADDITIONAL NOTES AND INSTRUCTIONS    Please follow up with your Primary MD in 24-48 hr.  Seek immediate medical care for any new/worsening signs or symptoms.       Dehydration, Adult    Dehydration is a condition in which there is not enough water or other fluids in the body. This happens when a person loses more fluids than he or she takes in. Important organs, such as the kidneys, brain, and heart, cannot function without a proper amount of fluids. Any loss of fluids from the body can lead to dehydration.    Dehydration can be mild, moderate, or severe. It should be treated right away to prevent it from becoming severe.    What are the causes?  Dehydration may be caused by:    Conditions that cause loss of water or other fluids, such as diarrhea, vomiting, or sweating or urinating a lot.  Not drinking enough fluids, especially when you are ill or doing activities that require a lot of energy.  Other illnesses and conditions, such as fever or infection.  Certain medicines, such as medicines that remove excess fluid from the body (diuretics).  Lack of safe drinking water.  Not being able to get enough water and food.    What increases the risk?  The following factors may make you more likely to develop this condition:    Having a long-term (chronic) illness that has not been treated properly, such as diabetes, heart disease, or kidney disease.  Being 65 years of age or older.  Having a disability.  Living in a place that is high in altitude, where thinner, drier air causes more fluid loss.  Doing exercises that put stress on your body for a long time (endurance sports).    What are the signs or symptoms?  Symptoms of dehydration depend on how severe it is.        Mild or moderate dehydration    Thirst.  Dry lips or dry mouth.  Dizziness or light-headedness, especially when standing up from a seated position.   Muscle cramps.   Dark urine. Urine may be the color of tea.   Less urine or tears produced than usual.  Headache.        Severe dehydration    Changes in skin. Your skin may be cold and clammy, blotchy, or pale. Your skin also may not return to normal after being lightly pinched and released.  Little or no tears, urine, or sweat.  Changes in vital signs, such as rapid breathing and low blood pressure. Your pulse may be weak or may be faster than 100 beats a minute when you are sitting still.  Other changes, such as:    Feeling very thirsty.  Sunken eyes.  Cold hands and feet.  Confusion.  Being very tired (lethargic) or having trouble waking from sleep.  Short-term weight loss.  Loss of consciousness.    How is this diagnosed?  This condition is diagnosed based on your symptoms and a physical exam. You may have blood and urine tests to help confirm the diagnosis.    How is this treated?  Treatment for this condition depends on how severe it is. Treatment should be started right away. Do not wait until dehydration becomes severe. Severe dehydration is an emergency and needs to be treated in a hospital.    Mild or moderate dehydration can be treated at home. You may be asked to:     Drink more fluids.  Drink an oral rehydration solution (ORS). This drink helps restore proper amounts of fluids and salts and minerals in the blood (electrolytes).  Severe dehydration can be treated:    With IV fluids.  By correcting abnormal levels of electrolytes. This is often done by giving electrolytes through a tube that is passed through your nose and into your stomach (nasogastric tube, or NG tube).  By treating the underlying cause of dehydration.    Follow these instructions at home:      Oral rehydration solution    If told by your health care provider, drink an ORS:    Make an ORS by following instructions on the package.  Start by drinking small amounts, about ½ cup (120 mL) every 5–10 minutes.  Slowly increase how much you drink until you have taken the amount recommended by your health care provider.        Eating and drinking       Drink enough clear fluid to keep your urine pale yellow. If you were told to drink an ORS, finish the ORS first and then start slowly drinking other clear fluids. Drink fluids such as:    Water. Do not drink only water. Doing that can lead to hyponatremia, which is having too little salt (sodium) in the body.  Water from ice chips you suck on.  Fruit juice that you have added water to (diluted fruit juice).  Low-calorie sports drinks.  Eat foods that contain a healthy balance of electrolytes, such as bananas, oranges, potatoes, tomatoes, and spinach.  Do not drink alcohol.  Avoid the following:    Drinks that contain a lot of sugar. These include high-calorie sports drinks, fruit juice that is not diluted, and soda.  Caffeine.  Foods that are greasy or contain a lot of fat or sugar.        General instructions    Take over-the-counter and prescription medicines only as told by your health care provider.  Do not take sodium tablets. Doing that can lead to having too much sodium in the body (hypernatremia).  Return to your normal activities as told by your health care provider. Ask your health care provider what activities are safe for you.  Keep all follow-up visits as told by your health care provider. This is important.    Contact a health care provider if:  You have muscle cramps, pain, or discomfort, such as:    Pain in your abdomen and the pain gets worse or stays in one area (localizes).  Stiff neck.  You have a rash.  You are more irritable than usual.  You are sleepier or have a harder time waking than usual.  You feel weak or dizzy.  You feel very thirsty.    Get help right away if you have:  Any symptoms of severe dehydration.  Symptoms of vomiting, such as:    You cannot eat or drink without vomiting.  Vomiting gets worse or does not go away.  Vomit includes blood or green matter (bile).  Symptoms that get worse with treatment.  A fever.  A severe headache.  Problems with urination or bowel movements, such as:    Diarrhea that gets worse or does not go away.  Blood in your stool (feces). This may cause stool to look black and tarry.  Not urinating, or urinating only a small amount of very dark urine, within 6–8 hours.  Trouble breathing.    These symptoms may represent a serious problem that is an emergency. Do not wait to see if the symptoms will go away. Get medical help right away. Call your local emergency services (911 in the [place] Do not drive yourself to the hospital.    Summary  Dehydration is a condition in which there is not enough water or other fluids in the body. This happens when a person loses more fluids than he or she takes in.  Treatment for this condition depends on how severe it is. Treatment should be started right away. Do not wait until dehydration becomes severe.  Drink enough clear fluid to keep your urine pale yellow. If you were told to drink an oral rehydration solution (ORS), finish the ORS first and then start slowly drinking other clear fluids.  Take over-the-counter and prescription medicines only as told by your health care provider.  Get help right away if you have any symptoms of severe dehydration.    ADDITIONAL NOTES AND INSTRUCTIONS    Please follow up with your Primary MD in 24-48 hr.  Seek immediate medical care for any new/worsening signs or symptoms. 1) Follow up with your doctor in 1-2 days  2) Return to the ER for worsening or concerning symptoms  3) Take medication as prescribed    Community-Acquired Pneumonia, Adult    Pneumonia is a lung infection that causes inflammation and the buildup of mucus and fluids in the lungs. This may cause coughing and difficulty breathing. Community-acquired pneumonia is pneumonia that develops in people who are not, and have not recently been, in a hospital or other health care facility.    Usually, pneumonia develops as a result of an illness that is caused by a virus, such as the common cold and the flu (influenza). It can also be caused by bacteria or fungi. While the common cold and influenza can pass from person to person (are contagious), pneumonia itself is not considered contagious.    What are the causes?     This condition may be caused by:    Viruses.  Bacteria.  Fungi, such as molds or mushrooms.    What increases the risk?  The following factors may make you more likely to develop this condition:    Having certain medical conditions, such as:    A long-term (chronic) disease, which may include chronic obstructive pulmonary disease (COPD), asthma, heart failure, cystic fibrosis, diabetes, kidney disease, sickle cell disease, and human immunodeficiency virus (HIV).  A condition that increases the risk of breathing in (aspirating) mucus and other fluids from your mouth and nose.  A weakened body defense system (immune system).  Having had your spleen removed (splenectomy). The spleen is the organ that helps fight germs and infections.  Not cleaning your teeth and gums well (poor dental hygiene).  Using tobacco products.  Traveling to places where germs that cause pneumonia are present.  Being near certain animals, or animal habitats, that have germs that cause pneumonia.  Being older than 65 years of age.    What are the signs or symptoms?  Symptoms of this condition include:    A dry cough or a wet (productive) cough.  A fever.  Sweating or chills.  Chest pain, especially when breathing deeply or coughing.  Fast breathing, difficulty breathing, or shortness of breath.  Tiredness (fatigue).  Muscle aches.    How is this diagnosed?     This condition may be diagnosed based on your medical history or a physical exam. You may also have tests, including:    Chest X-rays.  Tests of the level of oxygen and other gases in your blood.  Tests of:    Your blood.  Mucus from your lungs (sputum).  Fluid around your lungs (pleural fluid).  Your urine.    If your pneumonia is severe, other tests may be done to learn more about the cause.    How is this treated?  Treatment for this condition depends on many factors, such as the cause of your pneumonia, your medicines, and other medical conditions that you have.    For most adults, pneumonia may be treated at home. In some cases, treatment must happen in a hospital and may include:    Medicines that are given by mouth (orally) or through an IV, including:    Antibiotic medicines, if bacteria caused the pneumonia.  Medicines that kill viruses (antiviral medicines), if a virus caused the pneumonia.  Oxygen therapy.    Severe pneumonia, although rare, may require the following treatments:    Mechanical ventilation.This procedure uses a machine to help you breathe if you cannot breathe well on your own or maintain a safe level of blood oxygen.  Thoracentesis. This procedure removes any buildup of pleural fluid to help with breathing.    Follow these instructions at home:         Medicines    Take over-the-counter and prescription medicines only as told by your health care provider.  Take cough medicine only if you have trouble sleeping. Cough medicine can prevent your body from removing mucus from your lungs.  If you were prescribed an antibiotic medicine, take it as told by your health care provider. Do not stop taking the antibiotic even if you start to feel better.        Lifestyle      Do not drink alcohol.  Do not use any products that contain nicotine or tobacco, such as cigarettes, e-cigarettes, and chewing tobacco. If you need help quitting, ask your health care provider.  Eat a healthy diet. This includes plenty of vegetables, fruits, whole grains, low-fat dairy products, and lean protein.        General instructions    Rest a lot and get at least 8 hours of sleep each night.  Sleep in a partly upright position at night. Place a few pillows under your head or sleep in a reclining chair.  Return to your normal activities as told by your health care provider. Ask your health care provider what activities are safe for you.  Drink enough fluid to keep your urine pale yellow. This helps to thin the mucus in your lungs.  If your throat is sore, gargle with a salt–water mixture 3–4 times a day or as needed. To make a salt–water mixture, completely dissolve ½–1 tsp (3–6 g) of salt in 1 cup (237 mL) of warm water.  Keep all follow-up visits as told by your health care provider. This is important.    How is this prevented?  You can lower your risk of developing community-acquired pneumonia by:    Getting the pneumonia vaccine. There are different types and schedules of pneumonia vaccines. Ask your health care provider which option is best for you. Consider getting the pneumonia vaccine if:    You are older than 65 years of age.  You are 19–65 years of age and are receiving cancer treatment, have chronic lung disease, or have other medical conditions that affect your immune system. Ask your health care provider if this applies to you.  Getting your influenza vaccine every year. Ask your health care provider which type of vaccine is best for you.  Getting regular dental checkups.  Washing your hands often with soap and water for at least 20 seconds. If soap and water are not available, use hand .    Contact a health care provider if you have:  A fever.  Trouble sleeping because you cannot control your cough with cough medicine.    Get help right away if:  Your shortness of breath becomes worse.  Your chest pain increases.  Your sickness becomes worse, especially if you are an older adult or have a weak immune system.  You cough up blood.    These symptoms may represent a serious problem that is an emergency. Do not wait to see if the symptoms will go away. Get medical help right away. Call your local emergency services (911 in the [place] Do not drive yourself to the hospital.    Summary  Pneumonia is an infection of the lungs.  Community-acquired pneumonia develops in people who have not been in the hospital. It can be caused by bacteria, viruses, or fungi.  This condition may be treated with antibiotics or antiviral medicines.  Severe pneumonia may require a hospital stay and treatment to help with breathing.    ADDITIONAL NOTES AND INSTRUCTIONS    Please follow up with your Primary MD in 24-48 hr.  Seek immediate medical care for any new/worsening signs or symptoms.       Dehydration, Adult    Dehydration is a condition in which there is not enough water or other fluids in the body. This happens when a person loses more fluids than he or she takes in. Important organs, such as the kidneys, brain, and heart, cannot function without a proper amount of fluids. Any loss of fluids from the body can lead to dehydration.    Dehydration can be mild, moderate, or severe. It should be treated right away to prevent it from becoming severe.    What are the causes?  Dehydration may be caused by:    Conditions that cause loss of water or other fluids, such as diarrhea, vomiting, or sweating or urinating a lot.  Not drinking enough fluids, especially when you are ill or doing activities that require a lot of energy.  Other illnesses and conditions, such as fever or infection.  Certain medicines, such as medicines that remove excess fluid from the body (diuretics).  Lack of safe drinking water.  Not being able to get enough water and food.    What increases the risk?  The following factors may make you more likely to develop this condition:    Having a long-term (chronic) illness that has not been treated properly, such as diabetes, heart disease, or kidney disease.  Being 65 years of age or older.  Having a disability.  Living in a place that is high in altitude, where thinner, drier air causes more fluid loss.  Doing exercises that put stress on your body for a long time (endurance sports).    What are the signs or symptoms?  Symptoms of dehydration depend on how severe it is.        Mild or moderate dehydration    Thirst.  Dry lips or dry mouth.  Dizziness or light-headedness, especially when standing up from a seated position.   Muscle cramps.   Dark urine. Urine may be the color of tea.   Less urine or tears produced than usual.  Headache.        Severe dehydration    Changes in skin. Your skin may be cold and clammy, blotchy, or pale. Your skin also may not return to normal after being lightly pinched and released.  Little or no tears, urine, or sweat.  Changes in vital signs, such as rapid breathing and low blood pressure. Your pulse may be weak or may be faster than 100 beats a minute when you are sitting still.  Other changes, such as:    Feeling very thirsty.  Sunken eyes.  Cold hands and feet.  Confusion.  Being very tired (lethargic) or having trouble waking from sleep.  Short-term weight loss.  Loss of consciousness.    How is this diagnosed?  This condition is diagnosed based on your symptoms and a physical exam. You may have blood and urine tests to help confirm the diagnosis.    How is this treated?  Treatment for this condition depends on how severe it is. Treatment should be started right away. Do not wait until dehydration becomes severe. Severe dehydration is an emergency and needs to be treated in a hospital.    Mild or moderate dehydration can be treated at home. You may be asked to:     Drink more fluids.  Drink an oral rehydration solution (ORS). This drink helps restore proper amounts of fluids and salts and minerals in the blood (electrolytes).  Severe dehydration can be treated:    With IV fluids.  By correcting abnormal levels of electrolytes. This is often done by giving electrolytes through a tube that is passed through your nose and into your stomach (nasogastric tube, or NG tube).  By treating the underlying cause of dehydration.    Follow these instructions at home:      Oral rehydration solution    If told by your health care provider, drink an ORS:    Make an ORS by following instructions on the package.  Start by drinking small amounts, about ½ cup (120 mL) every 5–10 minutes.  Slowly increase how much you drink until you have taken the amount recommended by your health care provider.        Eating and drinking       Drink enough clear fluid to keep your urine pale yellow. If you were told to drink an ORS, finish the ORS first and then start slowly drinking other clear fluids. Drink fluids such as:    Water. Do not drink only water. Doing that can lead to hyponatremia, which is having too little salt (sodium) in the body.  Water from ice chips you suck on.  Fruit juice that you have added water to (diluted fruit juice).  Low-calorie sports drinks.  Eat foods that contain a healthy balance of electrolytes, such as bananas, oranges, potatoes, tomatoes, and spinach.  Do not drink alcohol.  Avoid the following:    Drinks that contain a lot of sugar. These include high-calorie sports drinks, fruit juice that is not diluted, and soda.  Caffeine.  Foods that are greasy or contain a lot of fat or sugar.        General instructions    Take over-the-counter and prescription medicines only as told by your health care provider.  Do not take sodium tablets. Doing that can lead to having too much sodium in the body (hypernatremia).  Return to your normal activities as told by your health care provider. Ask your health care provider what activities are safe for you.  Keep all follow-up visits as told by your health care provider. This is important.    Contact a health care provider if:  You have muscle cramps, pain, or discomfort, such as:    Pain in your abdomen and the pain gets worse or stays in one area (localizes).  Stiff neck.  You have a rash.  You are more irritable than usual.  You are sleepier or have a harder time waking than usual.  You feel weak or dizzy.  You feel very thirsty.    Get help right away if you have:  Any symptoms of severe dehydration.  Symptoms of vomiting, such as:    You cannot eat or drink without vomiting.  Vomiting gets worse or does not go away.  Vomit includes blood or green matter (bile).  Symptoms that get worse with treatment.  A fever.  A severe headache.  Problems with urination or bowel movements, such as:    Diarrhea that gets worse or does not go away.  Blood in your stool (feces). This may cause stool to look black and tarry.  Not urinating, or urinating only a small amount of very dark urine, within 6–8 hours.  Trouble breathing.    These symptoms may represent a serious problem that is an emergency. Do not wait to see if the symptoms will go away. Get medical help right away. Call your local emergency services (911 in the [place] Do not drive yourself to the hospital.    Summary  Dehydration is a condition in which there is not enough water or other fluids in the body. This happens when a person loses more fluids than he or she takes in.  Treatment for this condition depends on how severe it is. Treatment should be started right away. Do not wait until dehydration becomes severe.  Drink enough clear fluid to keep your urine pale yellow. If you were told to drink an oral rehydration solution (ORS), finish the ORS first and then start slowly drinking other clear fluids.  Take over-the-counter and prescription medicines only as told by your health care provider.  Get help right away if you have any symptoms of severe dehydration.    ADDITIONAL NOTES AND INSTRUCTIONS    Please follow up with your Primary MD in 24-48 hr.  Seek immediate medical care for any new/worsening signs or symptoms.      Influenza, Adult    Influenza, more commonly known as "the flu," is a viral infection that mainly affects the respiratory tract. The respiratory tract includes organs that help you breathe, such as the lungs, nose, and throat. The flu causes many symptoms similar to the common cold along with high fever and body aches.    The flu spreads easily from person to person (iscontagious). Getting a flu shot (influenza vaccination) every year is the best way to prevent the flu.    What are the causes?  This condition is caused by the influenza virus. You can get the virus by:    Breathing in droplets that are in the air from an infected person's cough or sneeze.  Touching something that has been exposed to the virus (has been contaminated) and then touching your mouth, nose, or eyes.    What increases the risk?  The following factors may make you more likely to get the flu:    Not washing or sanitizing your hands often.  Having close contact with many people during cold and flu season.  Touching your mouth, eyes, or nose without first washing or sanitizing your hands.  Not getting a yearly (annual) flu shot.    You may have a higher risk for the flu, including serious problems such as a lung infection (pneumonia), if you:    Are older than 65.  Are pregnant.  Have a weakened disease-fighting system (immune system). You may have a weakened immune system if you:    Have HIV or AIDS.  Are undergoing chemotherapy.  Are taking medicines that reduce (suppress) the activity of your immune system.  Have a long-term (chronic) illness, such as heart disease, kidney disease, diabetes, or lung disease.  Have a liver disorder.  Are severely overweight (morbidly obese).  Have anemia. This is a condition that affects your red blood cells.  Have asthma.    What are the signs or symptoms?  Symptoms of this condition usually begin suddenly and last 4–14 days. They may include:    Fever and chills.  Headaches, body aches, or muscle aches.  Sore throat.  Cough.  Runny or stuffy (congested) nose.  Chest discomfort.  Poor appetite.  Weakness or fatigue.  Dizziness.  Nausea or vomiting.    How is this diagnosed?  This condition may be diagnosed based on:    Your symptoms and medical history.  A physical exam.   Swabbing your nose or throat and testing the fluid for the influenza virus.    How is this treated?  If the flu is diagnosed early, you can be treated with medicine that can help reduce how severe the illness is and how long it lasts (antiviral medicine). This may be given by mouth (orally) or through an IV.    Taking care of yourself at home can help relieve symptoms. Your health care provider may recommend:    Taking over-the-counter medicines.  Drinking plenty of fluids.    In many cases, the flu goes away on its own. If you have severe symptoms or complications, you may be treated in a hospital.    Follow these instructions at home:      Activity    Rest as needed and get plenty of sleep.  Stay home from work or school as told by your health care provider. Unless you are visiting your health care provider, avoid leaving home until your fever has been gone for 24 hours without taking medicine.        Eating and drinking    Take an oral rehydration solution (ORS). This is a drink that is sold at pharmacies and retail stores.  Drink enough fluid to keep your urine pale yellow.  Drink clear fluids in small amounts as you are able. Clear fluids include water, ice chips, diluted fruit juice, and low-calorie sports drinks.  Eat bland, easy-to-digest foods in small amounts as you are able. These foods include bananas, applesauce, rice, lean meats, toast, and crackers.  Avoid drinking fluids that contain a lot of sugar or caffeine, such as energy drinks, regular sports drinks, and soda.  Avoid alcohol.  Avoid spicy or fatty foods.        General instructions      Take over-the-counter and prescription medicines only as told by your health care provider.  Use a cool mist humidifier to add humidity to the air in your home. This can make it easier to breathe.  Cover your mouth and nose when you cough or sneeze.  Wash your hands with soap and water often, especially after you cough or sneeze. If soap and water are not available, use alcohol-based hand .  Keep all follow-up visits as told by your health care provider. This is important.    How is this prevented?     Get an annual flu shot. You may get the flu shot in late summer, fall, or winter. Ask your health care provider when you should get your flu shot.  Avoid contact with people who are sick during cold and flu season. This is generally fall and winter.    Contact a health care provider if:  You develop new symptoms.  You have:    Chest pain.  Diarrhea.  A fever.  Your cough gets worse.  You produce more mucus.  You feel nauseous or you vomit.    Get help right away if:  You develop shortness of breath or difficulty breathing.  Your skin or nails turn a bluish color.  You have severe pain or stiffness in your neck.  You develop a sudden headache or sudden pain in your face or ear.  You cannot eat or drink without vomiting.    Summary  Influenza, more commonly known as "the flu," is a viral infection that primarily affects your respiratory tract.  Symptoms of the flu usually begin suddenly and last 4–14 days.  Getting an annual flu shot is the best way to prevent getting the flu.  Stay home from work or school as told by your health care provider. Unless you are visiting your health care provider, avoid leaving home until your fever has been gone for 24 hours without taking medicine.  Keep all follow-up visits as told by your health care provider. This is important.    ADDITIONAL NOTES AND INSTRUCTIONS    Please follow up with your Primary MD in 24-48 hr.  Seek immediate medical care for any new/worsening signs or symptoms.

## 2025-02-03 ENCOUNTER — TRANSCRIPTION ENCOUNTER (OUTPATIENT)
Age: 35
End: 2025-02-03

## 2025-02-03 VITALS
RESPIRATION RATE: 23 BRPM | DIASTOLIC BLOOD PRESSURE: 54 MMHG | SYSTOLIC BLOOD PRESSURE: 105 MMHG | HEART RATE: 108 BPM | OXYGEN SATURATION: 96 % | TEMPERATURE: 99 F

## 2025-02-03 DIAGNOSIS — J18.9 PNEUMONIA, UNSPECIFIED ORGANISM: ICD-10-CM

## 2025-02-03 LAB
ALBUMIN SERPL ELPH-MCNC: 3.5 G/DL — SIGNIFICANT CHANGE UP (ref 3.3–5.2)
ALP SERPL-CCNC: 42 U/L — SIGNIFICANT CHANGE UP (ref 40–120)
ALT FLD-CCNC: 13 U/L — SIGNIFICANT CHANGE UP
ANION GAP SERPL CALC-SCNC: 11 MMOL/L — SIGNIFICANT CHANGE UP (ref 5–17)
AST SERPL-CCNC: 20 U/L — SIGNIFICANT CHANGE UP
B PERT DNA SPEC QL NAA+PROBE: SIGNIFICANT CHANGE UP
BILIRUB SERPL-MCNC: 0.4 MG/DL — SIGNIFICANT CHANGE UP (ref 0.4–2)
BUN SERPL-MCNC: 9.2 MG/DL — SIGNIFICANT CHANGE UP (ref 8–20)
C PNEUM DNA SPEC QL NAA+PROBE: SIGNIFICANT CHANGE UP
CALCIUM SERPL-MCNC: 7.9 MG/DL — LOW (ref 8.4–10.5)
CHLORIDE SERPL-SCNC: 103 MMOL/L — SIGNIFICANT CHANGE UP (ref 96–108)
CO2 SERPL-SCNC: 24 MMOL/L — SIGNIFICANT CHANGE UP (ref 22–29)
CREAT SERPL-MCNC: 1.15 MG/DL — SIGNIFICANT CHANGE UP (ref 0.5–1.3)
D DIMER BLD IA.RAPID-MCNC: <150 NG/ML DDU — SIGNIFICANT CHANGE UP
EGFR: 87 ML/MIN/1.73M2 — SIGNIFICANT CHANGE UP
FLUAV H1 2009 PAND RNA SPEC QL NAA+PROBE: SIGNIFICANT CHANGE UP
FLUAV H1 RNA SPEC QL NAA+PROBE: SIGNIFICANT CHANGE UP
FLUAV H3 RNA SPEC QL NAA+PROBE: DETECTED
FLUAV SUBTYP SPEC NAA+PROBE: DETECTED
FLUBV RNA SPEC QL NAA+PROBE: SIGNIFICANT CHANGE UP
GLUCOSE SERPL-MCNC: 124 MG/DL — HIGH (ref 70–99)
HADV DNA SPEC QL NAA+PROBE: SIGNIFICANT CHANGE UP
HCOV PNL SPEC NAA+PROBE: SIGNIFICANT CHANGE UP
HCT VFR BLD CALC: 31.2 % — LOW (ref 39–50)
HGB BLD-MCNC: 11.2 G/DL — LOW (ref 13–17)
HMPV RNA SPEC QL NAA+PROBE: SIGNIFICANT CHANGE UP
HPIV1 RNA SPEC QL NAA+PROBE: SIGNIFICANT CHANGE UP
HPIV2 RNA SPEC QL NAA+PROBE: SIGNIFICANT CHANGE UP
HPIV3 RNA SPEC QL NAA+PROBE: SIGNIFICANT CHANGE UP
HPIV4 RNA SPEC QL NAA+PROBE: SIGNIFICANT CHANGE UP
MCHC RBC-ENTMCNC: 30.1 PG — SIGNIFICANT CHANGE UP (ref 27–34)
MCHC RBC-ENTMCNC: 35.9 G/DL — SIGNIFICANT CHANGE UP (ref 32–36)
MCV RBC AUTO: 83.9 FL — SIGNIFICANT CHANGE UP (ref 80–100)
PLATELET # BLD AUTO: 165 K/UL — SIGNIFICANT CHANGE UP (ref 150–400)
POTASSIUM SERPL-MCNC: 3.6 MMOL/L — SIGNIFICANT CHANGE UP (ref 3.5–5.3)
POTASSIUM SERPL-SCNC: 3.6 MMOL/L — SIGNIFICANT CHANGE UP (ref 3.5–5.3)
PROT SERPL-MCNC: 6.1 G/DL — LOW (ref 6.6–8.7)
RAPID RVP RESULT: DETECTED
RBC # BLD: 3.72 M/UL — LOW (ref 4.2–5.8)
RBC # FLD: 11.6 % — SIGNIFICANT CHANGE UP (ref 10.3–14.5)
RSV RNA SPEC QL NAA+PROBE: SIGNIFICANT CHANGE UP
RV+EV RNA SPEC QL NAA+PROBE: SIGNIFICANT CHANGE UP
SARS-COV-2 RNA SPEC QL NAA+PROBE: SIGNIFICANT CHANGE UP
SODIUM SERPL-SCNC: 138 MMOL/L — SIGNIFICANT CHANGE UP (ref 135–145)
TROPONIN T, HIGH SENSITIVITY RESULT: 7 NG/L — SIGNIFICANT CHANGE UP (ref 0–51)
WBC # BLD: 6.21 K/UL — SIGNIFICANT CHANGE UP (ref 3.8–10.5)
WBC # FLD AUTO: 6.21 K/UL — SIGNIFICANT CHANGE UP (ref 3.8–10.5)

## 2025-02-03 PROCEDURE — 85025 COMPLETE CBC W/AUTO DIFF WBC: CPT

## 2025-02-03 PROCEDURE — 96361 HYDRATE IV INFUSION ADD-ON: CPT

## 2025-02-03 PROCEDURE — 84295 ASSAY OF SERUM SODIUM: CPT

## 2025-02-03 PROCEDURE — 85610 PROTHROMBIN TIME: CPT

## 2025-02-03 PROCEDURE — 94640 AIRWAY INHALATION TREATMENT: CPT

## 2025-02-03 PROCEDURE — 99291 CRITICAL CARE FIRST HOUR: CPT

## 2025-02-03 PROCEDURE — 80053 COMPREHEN METABOLIC PANEL: CPT

## 2025-02-03 PROCEDURE — 96374 THER/PROPH/DIAG INJ IV PUSH: CPT

## 2025-02-03 PROCEDURE — 71045 X-RAY EXAM CHEST 1 VIEW: CPT

## 2025-02-03 PROCEDURE — 85027 COMPLETE CBC AUTOMATED: CPT

## 2025-02-03 PROCEDURE — 96376 TX/PRO/DX INJ SAME DRUG ADON: CPT

## 2025-02-03 PROCEDURE — 85014 HEMATOCRIT: CPT

## 2025-02-03 PROCEDURE — 84132 ASSAY OF SERUM POTASSIUM: CPT

## 2025-02-03 PROCEDURE — 82803 BLOOD GASES ANY COMBINATION: CPT

## 2025-02-03 PROCEDURE — 83605 ASSAY OF LACTIC ACID: CPT

## 2025-02-03 PROCEDURE — 85018 HEMOGLOBIN: CPT

## 2025-02-03 PROCEDURE — 96375 TX/PRO/DX INJ NEW DRUG ADDON: CPT

## 2025-02-03 PROCEDURE — 82330 ASSAY OF CALCIUM: CPT

## 2025-02-03 PROCEDURE — 99239 HOSP IP/OBS DSCHRG MGMT >30: CPT

## 2025-02-03 PROCEDURE — 36415 COLL VENOUS BLD VENIPUNCTURE: CPT

## 2025-02-03 PROCEDURE — 87040 BLOOD CULTURE FOR BACTERIA: CPT

## 2025-02-03 PROCEDURE — 82435 ASSAY OF BLOOD CHLORIDE: CPT

## 2025-02-03 PROCEDURE — 82947 ASSAY GLUCOSE BLOOD QUANT: CPT

## 2025-02-03 PROCEDURE — 86703 HIV-1/HIV-2 1 RESULT ANTBDY: CPT

## 2025-02-03 PROCEDURE — 85379 FIBRIN DEGRADATION QUANT: CPT

## 2025-02-03 PROCEDURE — 84484 ASSAY OF TROPONIN QUANT: CPT

## 2025-02-03 PROCEDURE — 0225U NFCT DS DNA&RNA 21 SARSCOV2: CPT

## 2025-02-03 PROCEDURE — 85730 THROMBOPLASTIN TIME PARTIAL: CPT

## 2025-02-03 RX ORDER — AMOXICILLIN AND CLAVULANATE POTASSIUM 200; 28.5 MG/5ML; MG/5ML
875 POWDER, FOR SUSPENSION ORAL
Qty: 10 | Refills: 0
Start: 2025-02-03 | End: 2025-02-07

## 2025-02-03 RX ORDER — OSELTAMIVIR PHOSPHATE 75 MG/1
1 CAPSULE ORAL
Qty: 8 | Refills: 0
Start: 2025-02-03 | End: 2025-02-06

## 2025-02-03 RX ORDER — POTASSIUM CHLORIDE 750 MG/1
40 TABLET, EXTENDED RELEASE ORAL ONCE
Refills: 0 | Status: COMPLETED | OUTPATIENT
Start: 2025-02-03 | End: 2025-02-03

## 2025-02-03 RX ORDER — HYDROCODONE/HOMATROPINE
5 SYRUP ORAL
Qty: 140 | Refills: 0
Start: 2025-02-03 | End: 2025-02-09

## 2025-02-03 RX ORDER — OSELTAMIVIR PHOSPHATE 75 MG/1
75 CAPSULE ORAL
Refills: 0 | Status: DISCONTINUED | OUTPATIENT
Start: 2025-02-03 | End: 2025-02-03

## 2025-02-03 RX ORDER — ALBUTEROL 90 MCG
3 AEROSOL REFILL (GRAM) INHALATION
Qty: 9 | Refills: 0
Start: 2025-02-03 | End: 2025-02-09

## 2025-02-03 RX ORDER — KETOROLAC TROMETHAMINE 10 MG
15 TABLET ORAL ONCE
Refills: 0 | Status: DISCONTINUED | OUTPATIENT
Start: 2025-02-03 | End: 2025-02-03

## 2025-02-03 RX ORDER — BACTERIOSTATIC SODIUM CHLORIDE 0.9 %
1000 VIAL (ML) INJECTION ONCE
Refills: 0 | Status: COMPLETED | OUTPATIENT
Start: 2025-02-03 | End: 2025-02-03

## 2025-02-03 RX ORDER — PREDNISONE 5 MG/1
1 TABLET ORAL
Qty: 4 | Refills: 0
Start: 2025-02-03 | End: 2025-02-06

## 2025-02-03 RX ORDER — IPRATROPIUM BROMIDE AND ALBUTEROL SULFATE .5; 2.5 MG/3ML; MG/3ML
3 SOLUTION RESPIRATORY (INHALATION) EVERY 6 HOURS
Refills: 0 | Status: DISCONTINUED | OUTPATIENT
Start: 2025-02-03 | End: 2025-02-03

## 2025-02-03 RX ORDER — ACETAMINOPHEN 160 MG/5ML
975 SUSPENSION ORAL ONCE
Refills: 0 | Status: COMPLETED | OUTPATIENT
Start: 2025-02-03 | End: 2025-02-03

## 2025-02-03 RX ORDER — PREDNISONE 5 MG/1
10 TABLET ORAL DAILY
Refills: 0 | Status: DISCONTINUED | OUTPATIENT
Start: 2025-02-03 | End: 2025-02-03

## 2025-02-03 RX ORDER — AZITHROMYCIN DIHYDRATE 500 MG/1
1 TABLET, FILM COATED ORAL
Qty: 1 | Refills: 0
Start: 2025-02-03 | End: 2025-02-07

## 2025-02-03 RX ORDER — ACETAMINOPHEN 160 MG/5ML
650 SUSPENSION ORAL EVERY 6 HOURS
Refills: 0 | Status: DISCONTINUED | OUTPATIENT
Start: 2025-02-03 | End: 2025-02-03

## 2025-02-03 RX ORDER — AZITHROMYCIN DIHYDRATE 500 MG/1
500 TABLET, FILM COATED ORAL ONCE
Refills: 0 | Status: COMPLETED | OUTPATIENT
Start: 2025-02-03 | End: 2025-02-03

## 2025-02-03 RX ADMIN — Medication 15 MILLIGRAM(S): at 04:09

## 2025-02-03 RX ADMIN — Medication 15 MILLIGRAM(S): at 02:49

## 2025-02-03 RX ADMIN — ACETAMINOPHEN 975 MILLIGRAM(S): 160 SUSPENSION ORAL at 04:09

## 2025-02-03 RX ADMIN — AZITHROMYCIN DIHYDRATE 255 MILLIGRAM(S): 500 TABLET, FILM COATED ORAL at 07:07

## 2025-02-03 RX ADMIN — OSELTAMIVIR PHOSPHATE 75 MILLIGRAM(S): 75 CAPSULE ORAL at 08:17

## 2025-02-03 RX ADMIN — Medication 15 MILLIGRAM(S): at 07:08

## 2025-02-03 RX ADMIN — Medication 15 MILLIGRAM(S): at 05:15

## 2025-02-03 RX ADMIN — ACETAMINOPHEN 650 MILLIGRAM(S): 160 SUSPENSION ORAL at 10:24

## 2025-02-03 RX ADMIN — Medication 1000 MILLILITER(S): at 05:00

## 2025-02-03 RX ADMIN — ACETAMINOPHEN 975 MILLIGRAM(S): 160 SUSPENSION ORAL at 05:00

## 2025-02-03 RX ADMIN — IPRATROPIUM BROMIDE AND ALBUTEROL SULFATE 3 MILLILITER(S): .5; 2.5 SOLUTION RESPIRATORY (INHALATION) at 10:24

## 2025-02-03 RX ADMIN — Medication 1000 MILLILITER(S): at 04:08

## 2025-02-03 RX ADMIN — PREDNISONE 10 MILLIGRAM(S): 5 TABLET ORAL at 10:25

## 2025-02-03 RX ADMIN — POTASSIUM CHLORIDE 40 MILLIEQUIVALENT(S): 750 TABLET, EXTENDED RELEASE ORAL at 01:26

## 2025-02-03 NOTE — DISCHARGE NOTE PROVIDER - NSDCCPCAREPLAN_GEN_ALL_CORE_FT
PRINCIPAL DISCHARGE DIAGNOSIS  Diagnosis: Flu  Assessment and Plan of Treatment:       SECONDARY DISCHARGE DIAGNOSES  Diagnosis: Acute dehydration  Assessment and Plan of Treatment:     Diagnosis: Influenza  Assessment and Plan of Treatment:

## 2025-02-03 NOTE — DISCHARGE NOTE NURSING/CASE MANAGEMENT/SOCIAL WORK - NSDCPEFALRISK_GEN_ALL_CORE
For information on Fall & Injury Prevention, visit: [email or web address] OR  [email or web address] OR  https://www.cdc.gov/steadi/patient.html

## 2025-02-03 NOTE — ED ADULT NURSE REASSESSMENT NOTE - NS ED NURSE REASSESS COMMENT FT1
Assumed care of pt from night shift RN. Pt AOx4, ambulatory at baseline. C/o weakness, denies pain at this time. Pt resting in bed, NAD noted. Resp even and unlabored. Bed locked and in lowest position, pt remains on tele monitor in NSR w/.

## 2025-02-03 NOTE — ED ADULT NURSE REASSESSMENT NOTE - NS ED NURSE REASSESS COMMENT FT1
Pt AOx4. Resting comfortably in bed. Pt denies any pain or other complaints, NAD noted at this time, RR even and unlabored. Bed locked and in lowest position, pt on tele monitor in NSR w/.

## 2025-02-03 NOTE — DISCHARGE NOTE PROVIDER - NSDCMRMEDTOKEN_GEN_ALL_CORE_FT
albuterol 2.5 mg/3 mL (0.083%) inhalation solution: 3 milliliter(s) by nebulizer every 6 hours as needed for  shortness of breath and/or wheezing  hydrocodone-homatropine 5 mg-1.5 mg/5 mL oral syrup: 5 milliliter(s) orally every 6 hours as needed for  cough MDD: 4  oseltamivir 75 mg oral capsule: 1 cap(s) orally 2 times a day  predniSONE 10 mg oral tablet: 1 tab(s) orally once a day   albuterol 2.5 mg/3 mL (0.083%) inhalation solution: 3 milliliter(s) by nebulizer every 6 hours as needed for  shortness of breath and/or wheezing  hydrocodone-homatropine 5 mg-1.5 mg/5 mL oral syrup: 5 milliliter(s) orally every 6 hours as needed for  cough MDD: 4  nebulizer: as directed  oseltamivir 75 mg oral capsule: 1 cap(s) orally 2 times a day  predniSONE 10 mg oral tablet: 1 tab(s) orally once a day

## 2025-02-03 NOTE — DISCHARGE NOTE NURSING/CASE MANAGEMENT/SOCIAL WORK - PATIENT PORTAL LINK FT
You can access the FollowMyHealth Patient Portal offered by [facility] by registering at the following website: [email or web address]. By joining [facility]’s FollowMyHealth portal, you will also be able to view your health information using other applications (apps) compatible with our system.

## 2025-02-03 NOTE — DISCHARGE NOTE PROVIDER - ATTENDING DISCHARGE PHYSICAL EXAMINATION:
PHYSICAL EXAM:    GENERAL: NAD, well-groomed, well-developed  HEAD:  Atraumatic, Normocephalic  EYES: EOMI, PERRLA, conjunctiva and sclera clear  ENMT: No tonsillar erythema, exudates, or enlargement; Moist mucous membranes, Good dentition, No lesions  NECK: Supple, No JVD, Normal thyroid  NERVOUS SYSTEM:  Alert & Oriented X3, Good concentration; Motor Strength 5/5 B/L upper and lower extremities; DTRs 2+ intact and symmetric  CHEST/LUNG: Clear to percussion bilaterally; No rales, rhonchi, wheezing, or rubs  HEART: Regular rate and rhythm; No murmurs, rubs, or gallops  ABDOMEN: Soft, Nontender, Nondistended; Bowel sounds present  EXTREMITIES:  2+ Peripheral Pulses, No clubbing, cyanosis, or edema  LYMPH: No lymphadenopathy noted  SKIN: No rashes or lesions

## 2025-02-03 NOTE — DISCHARGE NOTE PROVIDER - HOSPITAL COURSE
33 yo male with no significant PMH presenting with fever cough and malaises Pt's daughter tested positive for influenza 5 days prior  patient admitted to medicine and started ob tamilfu. patient had neg ddimer and no white coutn cleared for dc home

## 2025-02-03 NOTE — DISCHARGE NOTE NURSING/CASE MANAGEMENT/SOCIAL WORK - FINANCIAL ASSISTANCE
[facility] provides services at a reduced cost to those who are determined to be eligible through [facility]’s financial assistance program. Information regarding [facility]’s financial assistance program can be found by going to [email or web address] or by calling [phone].

## 2025-02-08 LAB
CULTURE RESULTS: SIGNIFICANT CHANGE UP
CULTURE RESULTS: SIGNIFICANT CHANGE UP
SPECIMEN SOURCE: SIGNIFICANT CHANGE UP
SPECIMEN SOURCE: SIGNIFICANT CHANGE UP